# Patient Record
Sex: MALE | Race: WHITE | Employment: UNEMPLOYED | URBAN - METROPOLITAN AREA
[De-identification: names, ages, dates, MRNs, and addresses within clinical notes are randomized per-mention and may not be internally consistent; named-entity substitution may affect disease eponyms.]

---

## 2022-02-19 ENCOUNTER — HOSPITAL ENCOUNTER (OUTPATIENT)
Facility: HOSPITAL | Age: 22
Discharge: HOME | End: 2022-02-21
Attending: FAMILY MEDICINE | Admitting: FAMILY MEDICINE
Payer: COMMERCIAL

## 2022-02-19 DIAGNOSIS — K35.80 ACUTE APPENDICITIS, UNSPECIFIED ACUTE APPENDICITIS TYPE: ICD-10-CM

## 2022-02-19 DIAGNOSIS — R07.9 CHEST PAIN: ICD-10-CM

## 2022-02-19 DIAGNOSIS — K56.600 PARTIAL SMALL BOWEL OBSTRUCTION: ICD-10-CM

## 2022-02-19 DIAGNOSIS — R55 NEAR SYNCOPE: ICD-10-CM

## 2022-02-19 DIAGNOSIS — K92.2 GASTROINTESTINAL HEMORRHAGE, UNSPECIFIED GASTROINTESTINAL HEMORRHAGE TYPE: ICD-10-CM

## 2022-02-19 DIAGNOSIS — R11.2 NON-INTRACTABLE VOMITING WITH NAUSEA, UNSPECIFIED VOMITING TYPE: Primary | ICD-10-CM

## 2022-02-19 LAB
ALBUMIN SERPL BCP-MCNC: 3.6 G/DL (ref 3.5–5.2)
ALP SERPL-CCNC: 47 U/L (ref 38–126)
ALT SERPL W/O P-5'-P-CCNC: 17 U/L (ref 10–44)
ANION GAP SERPL CALC-SCNC: 13 MMOL/L (ref 8–16)
AST SERPL-CCNC: 28 U/L (ref 15–46)
BASOPHILS # BLD AUTO: 0.1 K/UL (ref 0–0.2)
BASOPHILS NFR BLD: 0.5 % (ref 0–1.9)
BILIRUB SERPL-MCNC: 1.4 MG/DL (ref 0.1–1)
CALCIUM SERPL-MCNC: 7.6 MG/DL (ref 8.7–10.5)
CHLORIDE SERPL-SCNC: 106 MMOL/L (ref 95–110)
CO2 SERPL-SCNC: 20 MMOL/L (ref 23–29)
CREAT SERPL-MCNC: 0.63 MG/DL (ref 0.5–1.4)
DIFFERENTIAL METHOD: ABNORMAL
EOSINOPHIL # BLD AUTO: 0 K/UL (ref 0–0.5)
EOSINOPHIL NFR BLD: 0.1 % (ref 0–8)
ERYTHROCYTE [DISTWIDTH] IN BLOOD BY AUTOMATED COUNT: 12.5 % (ref 11.5–14.5)
EST. GFR  (AFRICAN AMERICAN): >60 ML/MIN/1.73 M^2
EST. GFR  (NON AFRICAN AMERICAN): >60 ML/MIN/1.73 M^2
ETHANOL SERPL-MCNC: <10 MG/DL
GLUCOSE SERPL-MCNC: 113 MG/DL (ref 70–110)
HCT VFR BLD AUTO: 36.8 % (ref 40–54)
HGB BLD-MCNC: 12.5 G/DL (ref 14–18)
IMM GRANULOCYTES # BLD AUTO: 0.1 K/UL (ref 0–0.04)
IMM GRANULOCYTES NFR BLD AUTO: 0.5 % (ref 0–0.5)
LACTATE SERPL-SCNC: 3 MMOL/L (ref 0.5–2.2)
LIPASE SERPL-CCNC: 23 U/L (ref 23–300)
LYMPHOCYTES # BLD AUTO: 1.8 K/UL (ref 1–4.8)
LYMPHOCYTES NFR BLD: 9.8 % (ref 18–48)
MCH RBC QN AUTO: 30.1 PG (ref 27–31)
MCHC RBC AUTO-ENTMCNC: 34 G/DL (ref 32–36)
MCV RBC AUTO: 89 FL (ref 82–98)
MONOCYTES # BLD AUTO: 1.6 K/UL (ref 0.3–1)
MONOCYTES NFR BLD: 8.5 % (ref 4–15)
NEUTROPHILS # BLD AUTO: 15.1 K/UL (ref 1.8–7.7)
NEUTROPHILS NFR BLD: 80.6 % (ref 38–73)
NRBC BLD-RTO: 0 /100 WBC
PLATELET # BLD AUTO: 258 K/UL (ref 150–450)
PMV BLD AUTO: 11.3 FL (ref 9.2–12.9)
POCT GLUCOSE: 80 MG/DL (ref 70–110)
POTASSIUM SERPL-SCNC: 4.8 MMOL/L (ref 3.5–5.1)
PROT SERPL-MCNC: 6.9 G/DL (ref 6–8.4)
RBC # BLD AUTO: 4.15 M/UL (ref 4.6–6.2)
SARS-COV-2 RDRP RESP QL NAA+PROBE: NEGATIVE
SODIUM SERPL-SCNC: 139 MMOL/L (ref 136–145)
UUN UR-MCNC: 19 MG/DL (ref 2–20)
WBC # BLD AUTO: 18.72 K/UL (ref 3.9–12.7)

## 2022-02-19 PROCEDURE — 80053 COMPREHEN METABOLIC PANEL: CPT | Mod: ER | Performed by: PHYSICIAN ASSISTANT

## 2022-02-19 PROCEDURE — 85025 COMPLETE CBC W/AUTO DIFF WBC: CPT | Mod: ER | Performed by: PHYSICIAN ASSISTANT

## 2022-02-19 PROCEDURE — 96361 HYDRATE IV INFUSION ADD-ON: CPT

## 2022-02-19 PROCEDURE — 82962 GLUCOSE BLOOD TEST: CPT

## 2022-02-19 PROCEDURE — 25500020 PHARM REV CODE 255: Mod: ER | Performed by: FAMILY MEDICINE

## 2022-02-19 PROCEDURE — 83690 ASSAY OF LIPASE: CPT | Mod: ER | Performed by: PHYSICIAN ASSISTANT

## 2022-02-19 PROCEDURE — 63600175 PHARM REV CODE 636 W HCPCS: Mod: ER | Performed by: FAMILY MEDICINE

## 2022-02-19 PROCEDURE — 96375 TX/PRO/DX INJ NEW DRUG ADDON: CPT

## 2022-02-19 PROCEDURE — 93010 EKG 12-LEAD: ICD-10-PCS | Mod: ,,, | Performed by: INTERNAL MEDICINE

## 2022-02-19 PROCEDURE — 25000003 PHARM REV CODE 250: Mod: ER | Performed by: PHYSICIAN ASSISTANT

## 2022-02-19 PROCEDURE — 83605 ASSAY OF LACTIC ACID: CPT | Mod: ER | Performed by: PHYSICIAN ASSISTANT

## 2022-02-19 PROCEDURE — 96365 THER/PROPH/DIAG IV INF INIT: CPT | Mod: 59

## 2022-02-19 PROCEDURE — 93010 ELECTROCARDIOGRAM REPORT: CPT | Mod: ,,, | Performed by: INTERNAL MEDICINE

## 2022-02-19 PROCEDURE — 82077 ASSAY SPEC XCP UR&BREATH IA: CPT | Mod: ER | Performed by: PHYSICIAN ASSISTANT

## 2022-02-19 PROCEDURE — 63600175 PHARM REV CODE 636 W HCPCS: Mod: ER | Performed by: PHYSICIAN ASSISTANT

## 2022-02-19 PROCEDURE — 87040 BLOOD CULTURE FOR BACTERIA: CPT | Mod: ER | Performed by: PHYSICIAN ASSISTANT

## 2022-02-19 PROCEDURE — U0002 COVID-19 LAB TEST NON-CDC: HCPCS | Mod: ER | Performed by: FAMILY MEDICINE

## 2022-02-19 PROCEDURE — 93005 ELECTROCARDIOGRAM TRACING: CPT | Mod: ER

## 2022-02-19 PROCEDURE — 25000003 PHARM REV CODE 250: Mod: ER | Performed by: FAMILY MEDICINE

## 2022-02-19 RX ORDER — ONDANSETRON 2 MG/ML
4 INJECTION INTRAMUSCULAR; INTRAVENOUS
Status: DISCONTINUED | OUTPATIENT
Start: 2022-02-19 | End: 2022-02-19

## 2022-02-19 RX ORDER — PROCHLORPERAZINE EDISYLATE 5 MG/ML
10 INJECTION INTRAMUSCULAR; INTRAVENOUS
Status: COMPLETED | OUTPATIENT
Start: 2022-02-19 | End: 2022-02-19

## 2022-02-19 RX ORDER — DIPHENHYDRAMINE HYDROCHLORIDE 50 MG/ML
25 INJECTION INTRAMUSCULAR; INTRAVENOUS
Status: COMPLETED | OUTPATIENT
Start: 2022-02-19 | End: 2022-02-19

## 2022-02-19 RX ORDER — LEVOFLOXACIN 5 MG/ML
750 INJECTION, SOLUTION INTRAVENOUS
Status: COMPLETED | OUTPATIENT
Start: 2022-02-19 | End: 2022-02-20

## 2022-02-19 RX ORDER — ONDANSETRON 2 MG/ML
4 INJECTION INTRAMUSCULAR; INTRAVENOUS
Status: COMPLETED | OUTPATIENT
Start: 2022-02-19 | End: 2022-02-19

## 2022-02-19 RX ORDER — DICYCLOMINE HYDROCHLORIDE 10 MG/ML
10 INJECTION INTRAMUSCULAR
Status: DISCONTINUED | OUTPATIENT
Start: 2022-02-19 | End: 2022-02-20

## 2022-02-19 RX ORDER — FAMOTIDINE 10 MG/ML
20 INJECTION INTRAVENOUS
Status: COMPLETED | OUTPATIENT
Start: 2022-02-19 | End: 2022-02-19

## 2022-02-19 RX ADMIN — SODIUM CHLORIDE 1000 ML: 0.9 INJECTION, SOLUTION INTRAVENOUS at 11:02

## 2022-02-19 RX ADMIN — LEVOFLOXACIN 750 MG: 5 INJECTION, SOLUTION INTRAVENOUS at 10:02

## 2022-02-19 RX ADMIN — SODIUM CHLORIDE 1000 ML: 0.9 INJECTION, SOLUTION INTRAVENOUS at 09:02

## 2022-02-19 RX ADMIN — IOHEXOL 100 ML: 350 INJECTION, SOLUTION INTRAVENOUS at 10:02

## 2022-02-19 RX ADMIN — ONDANSETRON 4 MG: 2 INJECTION, SOLUTION INTRAMUSCULAR; INTRAVENOUS at 11:02

## 2022-02-19 RX ADMIN — DIPHENHYDRAMINE HYDROCHLORIDE 25 MG: 50 INJECTION INTRAMUSCULAR; INTRAVENOUS at 09:02

## 2022-02-19 RX ADMIN — PROCHLORPERAZINE EDISYLATE 10 MG: 5 INJECTION INTRAMUSCULAR; INTRAVENOUS at 09:02

## 2022-02-19 RX ADMIN — FAMOTIDINE 20 MG: 10 INJECTION, SOLUTION INTRAVENOUS at 11:02

## 2022-02-19 NOTE — Clinical Note
Alli Riley   MRN: [id number]    Department:  [facility] Emergency Department   Date of Visit:  5/29/2018           Disclosure     Insurance plans vary and the physician(s) referred by the ER may not be covered by your plan.  Please con Diagnosis: Partial small bowel obstruction [[id number]]   Future Attending Provider: EDGARD GOMEZ [[id number]]   Is the patient being sent to ED Observation?: No   Admitting Provider:: EDGARD GOMEZ [[id number]]   tell this physician (or your personal doctor if your instructions are to return to your personal doctor) about any new or lasting problems. The primary care or specialist physician will see patients referred from the [facility] Emergency Department.  Uziel Polk

## 2022-02-20 PROBLEM — R10.30 PAIN RADIATING TO LOWER ABDOMEN: Status: ACTIVE | Noted: 2022-02-20

## 2022-02-20 PROBLEM — R11.10 NON-INTRACTABLE VOMITING: Status: ACTIVE | Noted: 2022-02-20

## 2022-02-20 PROBLEM — R11.2 NON-INTRACTABLE VOMITING WITH NAUSEA: Status: ACTIVE | Noted: 2022-02-20

## 2022-02-20 PROBLEM — E86.0 DEHYDRATION: Status: ACTIVE | Noted: 2022-02-20

## 2022-02-20 LAB
ALBUMIN SERPL BCP-MCNC: 3.2 G/DL (ref 3.5–5.2)
ALP SERPL-CCNC: 57 U/L (ref 55–135)
ALT SERPL W/O P-5'-P-CCNC: 12 U/L (ref 10–44)
AMPHET+METHAMPHET UR QL: NEGATIVE
ANION GAP SERPL CALC-SCNC: 10 MMOL/L (ref 8–16)
AST SERPL-CCNC: 11 U/L (ref 10–40)
BARBITURATES UR QL SCN>200 NG/ML: NEGATIVE
BENZODIAZ UR QL SCN>200 NG/ML: NEGATIVE
BILIRUB SERPL-MCNC: 1.2 MG/DL (ref 0.1–1)
BILIRUB UR QL STRIP: NEGATIVE
BUN SERPL-MCNC: 19 MG/DL (ref 6–20)
BZE UR QL SCN: NEGATIVE
CALCIUM SERPL-MCNC: 8.6 MG/DL (ref 8.7–10.5)
CANNABINOIDS UR QL SCN: NEGATIVE
CHLORIDE SERPL-SCNC: 104 MMOL/L (ref 95–110)
CLARITY UR: CLEAR
CO2 SERPL-SCNC: 23 MMOL/L (ref 23–29)
COLOR UR: ABNORMAL
CREAT SERPL-MCNC: 0.8 MG/DL (ref 0.5–1.4)
CREAT UR-MCNC: 181.9 MG/DL (ref 23–375)
EST. GFR  (AFRICAN AMERICAN): >60 ML/MIN/1.73 M^2
EST. GFR  (NON AFRICAN AMERICAN): >60 ML/MIN/1.73 M^2
GLUCOSE SERPL-MCNC: 149 MG/DL (ref 70–110)
GLUCOSE UR QL STRIP: NEGATIVE
HGB UR QL STRIP: NEGATIVE
KETONES UR QL STRIP: ABNORMAL
LACTATE SERPL-SCNC: 1.3 MMOL/L (ref 0.5–2.2)
LEUKOCYTE ESTERASE UR QL STRIP: NEGATIVE
METHADONE UR QL SCN>300 NG/ML: NEGATIVE
NITRITE UR QL STRIP: NEGATIVE
OPIATES UR QL SCN: NEGATIVE
PCP UR QL SCN>25 NG/ML: NEGATIVE
PH UR STRIP: 7 [PH] (ref 5–8)
POCT GLUCOSE: 107 MG/DL (ref 70–110)
POCT GLUCOSE: 113 MG/DL (ref 70–110)
POTASSIUM SERPL-SCNC: 4.4 MMOL/L (ref 3.5–5.1)
PROT SERPL-MCNC: 6.5 G/DL (ref 6–8.4)
PROT UR QL STRIP: ABNORMAL
SODIUM SERPL-SCNC: 137 MMOL/L (ref 136–145)
SP GR UR STRIP: >1.03 (ref 1–1.03)
TOXICOLOGY INFORMATION: NORMAL
URN SPEC COLLECT METH UR: ABNORMAL
UROBILINOGEN UR STRIP-ACNC: NEGATIVE EU/DL

## 2022-02-20 PROCEDURE — 96367 TX/PROPH/DG ADDL SEQ IV INF: CPT

## 2022-02-20 PROCEDURE — 63600175 PHARM REV CODE 636 W HCPCS: Performed by: FAMILY MEDICINE

## 2022-02-20 PROCEDURE — G0378 HOSPITAL OBSERVATION PER HR: HCPCS

## 2022-02-20 PROCEDURE — 81003 URINALYSIS AUTO W/O SCOPE: CPT | Performed by: PHYSICIAN ASSISTANT

## 2022-02-20 PROCEDURE — 96361 HYDRATE IV INFUSION ADD-ON: CPT

## 2022-02-20 PROCEDURE — 96365 THER/PROPH/DIAG IV INF INIT: CPT

## 2022-02-20 PROCEDURE — 99203 PR OFFICE/OUTPT VISIT, NEW, LEVL III, 30-44 MIN: ICD-10-PCS | Mod: ,,, | Performed by: SURGERY

## 2022-02-20 PROCEDURE — 63600175 PHARM REV CODE 636 W HCPCS: Performed by: STUDENT IN AN ORGANIZED HEALTH CARE EDUCATION/TRAINING PROGRAM

## 2022-02-20 PROCEDURE — 80307 DRUG TEST PRSMV CHEM ANLYZR: CPT | Performed by: PHYSICIAN ASSISTANT

## 2022-02-20 PROCEDURE — 96375 TX/PRO/DX INJ NEW DRUG ADDON: CPT | Performed by: FAMILY MEDICINE

## 2022-02-20 PROCEDURE — C9113 INJ PANTOPRAZOLE SODIUM, VIA: HCPCS | Performed by: STUDENT IN AN ORGANIZED HEALTH CARE EDUCATION/TRAINING PROGRAM

## 2022-02-20 PROCEDURE — 96372 THER/PROPH/DIAG INJ SC/IM: CPT | Performed by: STUDENT IN AN ORGANIZED HEALTH CARE EDUCATION/TRAINING PROGRAM

## 2022-02-20 PROCEDURE — 96361 HYDRATE IV INFUSION ADD-ON: CPT | Performed by: FAMILY MEDICINE

## 2022-02-20 PROCEDURE — 82962 GLUCOSE BLOOD TEST: CPT

## 2022-02-20 PROCEDURE — 25000003 PHARM REV CODE 250: Performed by: STUDENT IN AN ORGANIZED HEALTH CARE EDUCATION/TRAINING PROGRAM

## 2022-02-20 PROCEDURE — 99285 EMERGENCY DEPT VISIT HI MDM: CPT | Mod: 25

## 2022-02-20 PROCEDURE — 83605 ASSAY OF LACTIC ACID: CPT | Mod: ER | Performed by: FAMILY MEDICINE

## 2022-02-20 PROCEDURE — 25000003 PHARM REV CODE 250: Mod: ER | Performed by: FAMILY MEDICINE

## 2022-02-20 PROCEDURE — S0030 INJECTION, METRONIDAZOLE: HCPCS | Mod: ER | Performed by: FAMILY MEDICINE

## 2022-02-20 PROCEDURE — 99203 OFFICE O/P NEW LOW 30 MIN: CPT | Mod: ,,, | Performed by: SURGERY

## 2022-02-20 PROCEDURE — 63600175 PHARM REV CODE 636 W HCPCS: Mod: ER | Performed by: FAMILY MEDICINE

## 2022-02-20 PROCEDURE — 80053 COMPREHEN METABOLIC PANEL: CPT | Performed by: STUDENT IN AN ORGANIZED HEALTH CARE EDUCATION/TRAINING PROGRAM

## 2022-02-20 RX ORDER — PANTOPRAZOLE SODIUM 40 MG/10ML
40 INJECTION, POWDER, LYOPHILIZED, FOR SOLUTION INTRAVENOUS DAILY
Status: DISCONTINUED | OUTPATIENT
Start: 2022-02-20 | End: 2022-02-21 | Stop reason: HOSPADM

## 2022-02-20 RX ORDER — MORPHINE SULFATE 4 MG/ML
4 INJECTION, SOLUTION INTRAMUSCULAR; INTRAVENOUS EVERY 4 HOURS PRN
Status: DISCONTINUED | OUTPATIENT
Start: 2022-02-20 | End: 2022-02-21 | Stop reason: HOSPADM

## 2022-02-20 RX ORDER — SODIUM CHLORIDE 0.9 % (FLUSH) 0.9 %
5 SYRINGE (ML) INJECTION
Status: DISCONTINUED | OUTPATIENT
Start: 2022-02-20 | End: 2022-02-21 | Stop reason: HOSPADM

## 2022-02-20 RX ORDER — SODIUM CHLORIDE 0.9 % (FLUSH) 0.9 %
10 SYRINGE (ML) INJECTION
Status: DISCONTINUED | OUTPATIENT
Start: 2022-02-20 | End: 2022-02-21 | Stop reason: HOSPADM

## 2022-02-20 RX ORDER — ACETAMINOPHEN 325 MG/1
650 TABLET ORAL EVERY 8 HOURS PRN
Status: DISCONTINUED | OUTPATIENT
Start: 2022-02-20 | End: 2022-02-21 | Stop reason: HOSPADM

## 2022-02-20 RX ORDER — ONDANSETRON 8 MG/1
8 TABLET, ORALLY DISINTEGRATING ORAL EVERY 8 HOURS PRN
Status: DISCONTINUED | OUTPATIENT
Start: 2022-02-20 | End: 2022-02-21 | Stop reason: HOSPADM

## 2022-02-20 RX ORDER — SIMETHICONE 80 MG
1 TABLET,CHEWABLE ORAL 4 TIMES DAILY PRN
Status: DISCONTINUED | OUTPATIENT
Start: 2022-02-20 | End: 2022-02-21 | Stop reason: HOSPADM

## 2022-02-20 RX ORDER — ACETAMINOPHEN 325 MG/1
650 TABLET ORAL EVERY 4 HOURS PRN
Status: DISCONTINUED | OUTPATIENT
Start: 2022-02-20 | End: 2022-02-21 | Stop reason: HOSPADM

## 2022-02-20 RX ORDER — METRONIDAZOLE 500 MG/100ML
500 INJECTION, SOLUTION INTRAVENOUS
Status: COMPLETED | OUTPATIENT
Start: 2022-02-20 | End: 2022-02-20

## 2022-02-20 RX ORDER — HEPARIN SODIUM 5000 [USP'U]/ML
5000 INJECTION, SOLUTION INTRAVENOUS; SUBCUTANEOUS EVERY 8 HOURS
Status: DISCONTINUED | OUTPATIENT
Start: 2022-02-20 | End: 2022-02-20

## 2022-02-20 RX ORDER — NALOXONE HCL 0.4 MG/ML
0.02 VIAL (ML) INJECTION
Status: DISCONTINUED | OUTPATIENT
Start: 2022-02-20 | End: 2022-02-21 | Stop reason: HOSPADM

## 2022-02-20 RX ORDER — SODIUM CHLORIDE, SODIUM LACTATE, POTASSIUM CHLORIDE, CALCIUM CHLORIDE 600; 310; 30; 20 MG/100ML; MG/100ML; MG/100ML; MG/100ML
INJECTION, SOLUTION INTRAVENOUS CONTINUOUS
Status: DISCONTINUED | OUTPATIENT
Start: 2022-02-20 | End: 2022-02-21 | Stop reason: HOSPADM

## 2022-02-20 RX ORDER — DEXTROSE, SODIUM CHLORIDE, SODIUM LACTATE, POTASSIUM CHLORIDE, AND CALCIUM CHLORIDE 5; .6; .31; .03; .02 G/100ML; G/100ML; G/100ML; G/100ML; G/100ML
INJECTION, SOLUTION INTRAVENOUS
Status: COMPLETED | OUTPATIENT
Start: 2022-02-20 | End: 2022-02-20

## 2022-02-20 RX ORDER — PROCHLORPERAZINE EDISYLATE 5 MG/ML
5 INJECTION INTRAMUSCULAR; INTRAVENOUS EVERY 6 HOURS PRN
Status: DISCONTINUED | OUTPATIENT
Start: 2022-02-20 | End: 2022-02-21 | Stop reason: HOSPADM

## 2022-02-20 RX ORDER — TALC
6 POWDER (GRAM) TOPICAL NIGHTLY PRN
Status: DISCONTINUED | OUTPATIENT
Start: 2022-02-20 | End: 2022-02-21 | Stop reason: HOSPADM

## 2022-02-20 RX ORDER — ONDANSETRON 2 MG/ML
4 INJECTION INTRAMUSCULAR; INTRAVENOUS EVERY 8 HOURS PRN
Status: DISCONTINUED | OUTPATIENT
Start: 2022-02-20 | End: 2022-02-21 | Stop reason: HOSPADM

## 2022-02-20 RX ADMIN — METRONIDAZOLE 500 MG: 500 SOLUTION INTRAVENOUS at 01:02

## 2022-02-20 RX ADMIN — ACETAMINOPHEN 650 MG: 325 TABLET ORAL at 10:02

## 2022-02-20 RX ADMIN — PANTOPRAZOLE SODIUM 40 MG: 40 INJECTION, POWDER, FOR SOLUTION INTRAVENOUS at 09:02

## 2022-02-20 RX ADMIN — ONDANSETRON 4 MG: 2 INJECTION INTRAMUSCULAR; INTRAVENOUS at 01:02

## 2022-02-20 RX ADMIN — SODIUM CHLORIDE, SODIUM LACTATE, POTASSIUM CHLORIDE, AND CALCIUM CHLORIDE: .6; .31; .03; .02 INJECTION, SOLUTION INTRAVENOUS at 08:02

## 2022-02-20 RX ADMIN — DEXTROSE, SODIUM CHLORIDE, SODIUM LACTATE, POTASSIUM CHLORIDE, AND CALCIUM CHLORIDE: 5; .6; .31; .03; .02 INJECTION, SOLUTION INTRAVENOUS at 02:02

## 2022-02-20 RX ADMIN — PROMETHAZINE HYDROCHLORIDE 12.5 MG: 25 INJECTION INTRAMUSCULAR; INTRAVENOUS at 01:02

## 2022-02-20 RX ADMIN — SODIUM CHLORIDE, SODIUM LACTATE, POTASSIUM CHLORIDE, AND CALCIUM CHLORIDE: .6; .31; .03; .02 INJECTION, SOLUTION INTRAVENOUS at 05:02
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
## 2022-02-20 NOTE — SUBJECTIVE & OBJECTIVE
History reviewed. No pertinent past medical history.    No past surgical history on file.    Review of patient's allergies indicates:  No Known Allergies    No current facility-administered medications on file prior to encounter.     No current outpatient medications on file prior to encounter.   Unknown ADHD medication (Stimulant type)  Family History    None       Tobacco Use    Smoking status: Not on file    Smokeless tobacco: Not on file   Substance and Sexual Activity    Alcohol use: Not on file    Drug use: Not on file    Sexual activity: Not on file     Review of Systems   Constitutional:  Positive for appetite change and fatigue. Negative for chills and fever.   HENT:  Negative for congestion and sore throat.    Eyes:  Negative for visual disturbance.   Respiratory:  Negative for cough, choking, chest tightness and shortness of breath.    Cardiovascular:  Negative for chest pain and palpitations.   Gastrointestinal:  Positive for abdominal pain, blood in stool, diarrhea, nausea and vomiting.   Endocrine: Negative for polyuria.   Genitourinary:  Positive for decreased urine volume. Negative for flank pain.   Musculoskeletal:  Negative for back pain, neck pain and neck stiffness.   Skin:  Negative for color change and pallor.   Neurological:  Negative for dizziness, seizures, facial asymmetry, weakness, numbness and headaches.        Presyncope without LOC one incident reported   Psychiatric/Behavioral:  Negative for agitation. The patient is not nervous/anxious.    Objective:     Vital Signs (Most Recent):  Temp: 98.5 °F (36.9 °C) (02/20/22 0703)  Pulse: 107 (02/20/22 0703)  Resp: 18 (02/20/22 0703)  BP: (!) 145/82 (02/20/22 0703)  SpO2: 100 % (02/20/22 0703)   Vital Signs (24h Range):  Temp:  [97.7 °F (36.5 °C)-98.5 °F (36.9 °C)] 98.5 °F (36.9 °C)  Pulse:  [] 107  Resp:  [14-20] 18  SpO2:  [95 %-100 %] 100 %  BP: ()/(47-89) 145/82     Weight: 104.3 kg (230 lb)  Body mass index is 31.19  kg/m².    Physical Exam  Constitutional:       Appearance: Normal appearance.   HENT:      Head: Normocephalic.      Right Ear: External ear normal.      Left Ear: External ear normal.      Nose: Nose normal.      Mouth/Throat:      Comments: Mouth was tacky with signs of dry lips  Eyes:      Extraocular Movements: Extraocular movements intact.      Conjunctiva/sclera: Conjunctivae normal.      Pupils: Pupils are equal, round, and reactive to light.   Cardiovascular:      Rate and Rhythm: Normal rate and regular rhythm.      Pulses: Normal pulses.      Heart sounds: Normal heart sounds.   Pulmonary:      Effort: Pulmonary effort is normal.      Breath sounds: Normal breath sounds. No wheezing or rhonchi.   Abdominal:      General: Bowel sounds are normal. There is no distension.      Palpations: Abdomen is soft. There is no mass.      Tenderness: There is abdominal tenderness. There is no right CVA tenderness, left CVA tenderness or rebound.      Hernia: No hernia is present.      Comments: Tenderness located in the periumbilical and right lower quadrant.    Musculoskeletal:      Cervical back: Normal range of motion.         CRANIAL NERVES     CN III, IV, VI   Pupils are equal, round, and reactive to light.     Significant Labs: All pertinent labs within the past 24 hours have been reviewed.  Bilirubin:   Recent Labs   Lab 02/19/22 2118 02/20/22  0753   BILITOT 1.4* 1.2*     Blood Culture:   Recent Labs   Lab 02/19/22  2149   LABBLOO No Growth to date     CBC:   Recent Labs   Lab 02/19/22 2118 02/21/22  1151   WBC 18.72* 4.63   HGB 12.5* 10.2*   HCT 36.8* 31.1*    247     CMP:   Recent Labs   Lab 02/19/22 2118 02/20/22  0753    137   K 4.8 4.4    104   CO2 20* 23   * 149*   BUN 19 19   CREATININE 0.63 0.8   CALCIUM 7.6* 8.6*   PROT 6.9 6.5   ALBUMIN 3.6 3.2*   BILITOT 1.4* 1.2*   ALKPHOS 47 57   AST 28 11   ALT 17 12   ANIONGAP 13 10   EGFRNONAA >60.0 >60     Lactic Acid:   Recent Labs    Lab 02/19/22  2149 02/20/22  0140   LACTATE 3.0* 1.3     POCT Glucose:   Recent Labs   Lab 02/19/22 2028 02/20/22  1132 02/20/22 2117   POCTGLUCOSE 80 113* 107     Urine Studies:   Recent Labs   Lab 02/20/22  0708   COLORU [place]*   APPEARANCEUA Clear   PHUR 7.0   SPECGRAV >1.030*   PROTEINUA Trace*   GLUCUA Negative   KETONESU 2+*   BILIRUBINUA Negative   OCCULTUA Negative   NITRITE Negative   UROBILINOGEN Negative   LEUKOCYTESUR Negative       Significant Imaging: I have reviewed all pertinent imaging results/findings within the past 24 hours.  CT: I have reviewed all pertinent results/findings within the past 24 hours and my personal findings are:  Some fat stranding is noted in the right lower quadrant. Some fat stranding is noted adjacent to the appendix which appears slightly prominent in size at some portions.  Correlate clinically for appendicitis.. In the region of the ileum, there is a linear hyperdensity measuring up to 25 mm in length may relate to a foreign body see for example coronal image 50.  There is mild dilatation of the small bowel loops measuring up to 3.2 cm consistent with partial small bowel obstruction.

## 2022-02-20 NOTE — ED NOTES
Portable Xray at bedside. Mother provided belongings bag, she stated she has all his belongings in her possession.

## 2022-02-20 NOTE — ED NOTES
Pt actively vomiting bright yellow liquid, pt is diaphoretic, ashen and pale, pt vomited apx 650 ml in emesis bag

## 2022-02-20 NOTE — ED NOTES
Assumed care of pt. Pt resting, no c/o pain, pt attempting to void for UA sample. Parents and Admit MD at bedside, POC discussed, orders will be placed.

## 2022-02-20 NOTE — CONSULTS
[facility]  INPATIENT Consult    HPI: Shawn Pizarro is a 21 y.o. male with nausea, vomiting, and diarrhea for the last five days.  The episodes have been intermittent.  He felt dehydrated yesterday and presented to the ER.  He had no real pain until yesterday when he noted some vague bilateral lower quadrant discomfort.  He denies sick contacts and does not recall eating any bad food.  CT last night was read as can't rule out PSBO or appendicitis.  He is healthy and has had no surgeries.  He feels much better now after getting IVFs.  His N/V/D has totally abated and he is quite hungry.  He denies pain or F/C.    I have reviewed the patient's chart including prior progress notes, procedures and testing.     ROS:   Review of Systems   All other systems reviewed and are negative.      PROBLEM LIST:  Patient Active Problem List   Diagnosis    Pain radiating to lower abdomen    Non-intractable vomiting with nausea    Dehydration         HISTORY  History reviewed. No pertinent past medical history.    No past surgical history on file.         No family history on file.      MEDS:  No current facility-administered medications on file prior to encounter.     No current outpatient medications on file prior to encounter.       ALLERGIES:  Review of patient's allergies indicates:  No Known Allergies      VITALS:  Temp:  [97.7 °F (36.5 °C)-98.5 °F (36.9 °C)] 98.5 °F (36.9 °C)  Pulse:  [] 107  Resp:  [14-20] 18  SpO2:  [95 %-100 %] 100 %  BP: ()/(47-89) 145/82    I/O last 3 completed shifts:  In: 2200 [IV Piggyback:2200]  Out: -       PHYSICAL EXAM:  Physical Exam  Constitutional:       Appearance: Normal appearance.   HENT:      Head: Normocephalic and atraumatic.   Eyes:      Pupils: Pupils are equal, round, and reactive to light.   Pulmonary:      Effort: Pulmonary effort is normal.   Abdominal:      General: There is no distension.      Palpations: Abdomen is soft.      Tenderness: There is no  abdominal tenderness. There is no guarding or rebound.   Skin:     General: Skin is warm and dry.   Neurological:      Mental Status: Mental status is at baseline.   Psychiatric:         Mood and Affect: Mood normal.         Behavior: Behavior normal.           LABS:  Lab Results   Component Value Date    WBC 18.72 (H) 02/19/2022    RBC 4.15 (L) 02/19/2022    HGB 12.5 (L) 02/19/2022    HCT 36.8 (L) 02/19/2022     02/19/2022     Lab Results   Component Value Date     (H) 02/20/2022     02/20/2022    K 4.4 02/20/2022     02/20/2022    CO2 23 02/20/2022    BUN 19 02/20/2022    CREATININE 0.8 02/20/2022    CALCIUM 8.6 (L) 02/20/2022     Lab Results   Component Value Date    ALT 12 02/20/2022    AST 11 02/20/2022    ALKPHOS 57 02/20/2022    BILITOT 1.2 (H) 02/20/2022     No results found for: MG, PHOS    STUDIES:  CT abd images and reports were personally reviewed.        ASSESSMENT & PLAN:  21 y.o. male with what sound like gastroenteritis or food poisoning.  He has completely benign abdominal exam.  No surgical issues at present.  Call if needed.      Rex Carmen M.D., F.A.C.S.  [facility]  [facility]

## 2022-02-20 NOTE — ED NOTES
Report given to RC Aguirre.Nurses at bedside to start IV and obtain labs, pt becoming diaphoretic and pale. Pt vomiting. HOB elevated. IVF infusing and meds given. Mother at bedside. Safety maintained.

## 2022-02-20 NOTE — NURSING
Received report from RC Rodarte in ED. Pt is being transferred to room 471.  1242- pt arrived to unit via stretcher accompanied by transport staff and parents at the bedside. Assisted to bed, oriented to room. Vs obtained and documented. Placed yellow socks on. Bed at lowest position, wheels locked, call light within reach. IV fluids LR infusing at 125ml/hr. Doctor came to the bedside. Full liquid diet order placed per MD. Pt to remain in observation.

## 2022-02-20 NOTE — ED NOTES
Pt pale, ashen, diaphetic at this time, pt c/o left lower abd and states he feel like he needs to vomit.

## 2022-02-20 NOTE — PLAN OF CARE
Problem: Adult Inpatient Plan of Care  Goal: Plan of Care Review  Outcome: Ongoing, Progressing      02/20/22 1333   Admission   Initial VN Admission Questions Complete  (Patient arrived to unit, AAOx4. Admission questions complete. Patient reports abdominal pain 2/10; prn medicationr regimen reviewed with patient. POC reviewed. Allowed time for questions. NAD noted.)   Communication Issues? None   Shift   Virtual Nurse - Rounding Complete   Virtual Nurse - Patient Verbalized Approval Of Camera Use;VN Rounding   Type of Frequent Check   Type   (Admission)   Safety/Activity   Patient Rounds visualized patient;call light in patient/parent reach   Safety Promotion/Fall Prevention instructed to call staff for mobility;assistive device/personal item within reach   Pain/Comfort/Sleep   Preferred Pain Scale number (Numeric Rating Pain Scale)   Comfort/Acceptable Pain Level 2   Pain Body Location abdomen   Pain Rating (0-10): Rest 2   Sleep/Rest/Relaxation awake

## 2022-02-20 NOTE — PROVIDER PROGRESS NOTES - EMERGENCY DEPT.
Encounter Date: 2/19/2022    ED Physician Progress Notes        Physician Note:   21-year-old male presents to ER with nausea, vomiting, diarrhea for last 3 days.  Decreased appetite.  Correlate symptoms started after eating at a [employer] restaurant.  Had 2 large vomiting in the ED.  no fever.  Pain in abdomen is across lower abdomen.  Today not able to tolerate anything orally.  Elevated white cell count 18,000. Lactic acid 3.0.  CT of abdomen= partial small-bowel obstruction and concern for appendicitis.  Patient has been given IV fluids, Levaquin, Flagyl, Compazine, Benadryl, Zofran.  Surgical consult with  does not think needs admission or surgical intervention.  Has a was concerned for possibility of bacterial infection with elevated white cell count and unable to tolerate anything orally requires inpatient admission and I am not comfortable discharging this patient as advised.  Then he suggested will take consult from Medicine Department.  Patient primary is Dr. Garry Herrera.- family medicine., discuss with [facility] accepted patient for admission.

## 2022-02-20 NOTE — ED TRIAGE NOTES
Patient transferred from [facility] for acute appendicitis and partial small bowel. Patient states he is currently not in any pain nor has nausea. Parents are at the bedside. Mom states doctors went back and forth on whether patient would need surgery.     Review of patient's allergies indicates:  No Known Allergies     Patient has verified the spelling of their name and  on armband.   APPEARANCE: Patient is alert, calm, oriented x 4, and appears fatigued  SKIN: Skin is normal for race, warm, and dry. Normal skin turgor and mucous membranes moist.  CARDIAC: Normal rate and rhythm, no murmur heard.   RESPIRATORY:Normal rate and effort. Breath sounds clear bilaterally throughout chest. Respirations are equal and unlabored.    GASTRO: Bowel sounds normal, abdomen is soft, no tenderness, and no abdominal distention.  MUSCLE: Full ROM. No bony tenderness or soft tissue tenderness. No obvious deformity.  : Voids without complication

## 2022-02-21 VITALS
DIASTOLIC BLOOD PRESSURE: 65 MMHG | OXYGEN SATURATION: 100 % | RESPIRATION RATE: 18 BRPM | WEIGHT: 229.94 LBS | HEIGHT: 72 IN | HEART RATE: 83 BPM | TEMPERATURE: 97 F | BODY MASS INDEX: 31.14 KG/M2 | SYSTOLIC BLOOD PRESSURE: 126 MMHG

## 2022-02-21 LAB
ALBUMIN SERPL BCP-MCNC: 3 G/DL (ref 3.5–5.2)
ALP SERPL-CCNC: 48 U/L (ref 55–135)
ALT SERPL W/O P-5'-P-CCNC: 14 U/L (ref 10–44)
ANION GAP SERPL CALC-SCNC: 10 MMOL/L (ref 8–16)
AST SERPL-CCNC: 13 U/L (ref 10–40)
BASOPHILS # BLD AUTO: 0.03 K/UL (ref 0–0.2)
BASOPHILS NFR BLD: 0.6 % (ref 0–1.9)
BILIRUB SERPL-MCNC: 0.6 MG/DL (ref 0.1–1)
BUN SERPL-MCNC: 9 MG/DL (ref 6–20)
CALCIUM SERPL-MCNC: 8.8 MG/DL (ref 8.7–10.5)
CHLORIDE SERPL-SCNC: 103 MMOL/L (ref 95–110)
CO2 SERPL-SCNC: 27 MMOL/L (ref 23–29)
CREAT SERPL-MCNC: 0.7 MG/DL (ref 0.5–1.4)
DIFFERENTIAL METHOD: ABNORMAL
EOSINOPHIL # BLD AUTO: 0.3 K/UL (ref 0–0.5)
EOSINOPHIL NFR BLD: 6.9 % (ref 0–8)
ERYTHROCYTE [DISTWIDTH] IN BLOOD BY AUTOMATED COUNT: 12.5 % (ref 11.5–14.5)
EST. GFR  (AFRICAN AMERICAN): >60 ML/MIN/1.73 M^2
EST. GFR  (NON AFRICAN AMERICAN): >60 ML/MIN/1.73 M^2
GLUCOSE SERPL-MCNC: 82 MG/DL (ref 70–110)
HCT VFR BLD AUTO: 31.1 % (ref 40–54)
HGB BLD-MCNC: 10.2 G/DL (ref 14–18)
IMM GRANULOCYTES # BLD AUTO: 0.01 K/UL (ref 0–0.04)
IMM GRANULOCYTES NFR BLD AUTO: 0.2 % (ref 0–0.5)
LYMPHOCYTES # BLD AUTO: 1.6 K/UL (ref 1–4.8)
LYMPHOCYTES NFR BLD: 34.8 % (ref 18–48)
MAGNESIUM SERPL-MCNC: 1.9 MG/DL (ref 1.6–2.6)
MCH RBC QN AUTO: 29.8 PG (ref 27–31)
MCHC RBC AUTO-ENTMCNC: 32.8 G/DL (ref 32–36)
MCV RBC AUTO: 91 FL (ref 82–98)
MONOCYTES # BLD AUTO: 0.5 K/UL (ref 0.3–1)
MONOCYTES NFR BLD: 10.6 % (ref 4–15)
NEUTROPHILS # BLD AUTO: 2.2 K/UL (ref 1.8–7.7)
NEUTROPHILS NFR BLD: 46.9 % (ref 38–73)
NRBC BLD-RTO: 0 /100 WBC
OB PNL STL: POSITIVE
PHOSPHATE SERPL-MCNC: 3 MG/DL (ref 2.7–4.5)
PLATELET # BLD AUTO: 247 K/UL (ref 150–450)
PMV BLD AUTO: 10.9 FL (ref 9.2–12.9)
POTASSIUM SERPL-SCNC: 4 MMOL/L (ref 3.5–5.1)
PROT SERPL-MCNC: 6.6 G/DL (ref 6–8.4)
RBC # BLD AUTO: 3.42 M/UL (ref 4.6–6.2)
SODIUM SERPL-SCNC: 140 MMOL/L (ref 136–145)
WBC # BLD AUTO: 4.63 K/UL (ref 3.9–12.7)
WBC #/AREA STL HPF: NORMAL /[HPF]

## 2022-02-21 PROCEDURE — 63600175 PHARM REV CODE 636 W HCPCS: Performed by: STUDENT IN AN ORGANIZED HEALTH CARE EDUCATION/TRAINING PROGRAM

## 2022-02-21 PROCEDURE — 87046 STOOL CULTR AEROBIC BACT EA: CPT | Mod: 59 | Performed by: STUDENT IN AN ORGANIZED HEALTH CARE EDUCATION/TRAINING PROGRAM

## 2022-02-21 PROCEDURE — 36415 COLL VENOUS BLD VENIPUNCTURE: CPT | Performed by: STUDENT IN AN ORGANIZED HEALTH CARE EDUCATION/TRAINING PROGRAM

## 2022-02-21 PROCEDURE — 89055 LEUKOCYTE ASSESSMENT FECAL: CPT | Performed by: STUDENT IN AN ORGANIZED HEALTH CARE EDUCATION/TRAINING PROGRAM

## 2022-02-21 PROCEDURE — 96361 HYDRATE IV INFUSION ADD-ON: CPT | Performed by: FAMILY MEDICINE

## 2022-02-21 PROCEDURE — 83735 ASSAY OF MAGNESIUM: CPT | Performed by: STUDENT IN AN ORGANIZED HEALTH CARE EDUCATION/TRAINING PROGRAM

## 2022-02-21 PROCEDURE — 87177 OVA AND PARASITES SMEARS: CPT | Performed by: STUDENT IN AN ORGANIZED HEALTH CARE EDUCATION/TRAINING PROGRAM

## 2022-02-21 PROCEDURE — 87427 SHIGA-LIKE TOXIN AG IA: CPT | Performed by: STUDENT IN AN ORGANIZED HEALTH CARE EDUCATION/TRAINING PROGRAM

## 2022-02-21 PROCEDURE — 87329 GIARDIA AG IA: CPT | Performed by: STUDENT IN AN ORGANIZED HEALTH CARE EDUCATION/TRAINING PROGRAM

## 2022-02-21 PROCEDURE — 87045 FECES CULTURE AEROBIC BACT: CPT | Performed by: STUDENT IN AN ORGANIZED HEALTH CARE EDUCATION/TRAINING PROGRAM

## 2022-02-21 PROCEDURE — 87425 ROTAVIRUS AG IA: CPT | Performed by: STUDENT IN AN ORGANIZED HEALTH CARE EDUCATION/TRAINING PROGRAM

## 2022-02-21 PROCEDURE — G0378 HOSPITAL OBSERVATION PER HR: HCPCS

## 2022-02-21 PROCEDURE — 82272 OCCULT BLD FECES 1-3 TESTS: CPT | Performed by: STUDENT IN AN ORGANIZED HEALTH CARE EDUCATION/TRAINING PROGRAM

## 2022-02-21 PROCEDURE — 94761 N-INVAS EAR/PLS OXIMETRY MLT: CPT

## 2022-02-21 PROCEDURE — 96376 TX/PRO/DX INJ SAME DRUG ADON: CPT | Performed by: FAMILY MEDICINE

## 2022-02-21 PROCEDURE — 84100 ASSAY OF PHOSPHORUS: CPT | Performed by: STUDENT IN AN ORGANIZED HEALTH CARE EDUCATION/TRAINING PROGRAM

## 2022-02-21 PROCEDURE — 82656 EL-1 FECAL QUAL/SEMIQ: CPT | Performed by: STUDENT IN AN ORGANIZED HEALTH CARE EDUCATION/TRAINING PROGRAM

## 2022-02-21 PROCEDURE — 80053 COMPREHEN METABOLIC PANEL: CPT | Performed by: STUDENT IN AN ORGANIZED HEALTH CARE EDUCATION/TRAINING PROGRAM

## 2022-02-21 PROCEDURE — 85025 COMPLETE CBC W/AUTO DIFF WBC: CPT | Performed by: STUDENT IN AN ORGANIZED HEALTH CARE EDUCATION/TRAINING PROGRAM

## 2022-02-21 PROCEDURE — C9113 INJ PANTOPRAZOLE SODIUM, VIA: HCPCS | Performed by: STUDENT IN AN ORGANIZED HEALTH CARE EDUCATION/TRAINING PROGRAM

## 2022-02-21 PROCEDURE — 83993 ASSAY FOR CALPROTECTIN FECAL: CPT | Performed by: STUDENT IN AN ORGANIZED HEALTH CARE EDUCATION/TRAINING PROGRAM

## 2022-02-21 PROCEDURE — 87209 SMEAR COMPLEX STAIN: CPT | Performed by: STUDENT IN AN ORGANIZED HEALTH CARE EDUCATION/TRAINING PROGRAM

## 2022-02-21 RX ADMIN — PANTOPRAZOLE SODIUM 40 MG: 40 INJECTION, POWDER, FOR SOLUTION INTRAVENOUS at 09:02

## 2022-02-21 RX ADMIN — SODIUM CHLORIDE, SODIUM LACTATE, POTASSIUM CHLORIDE, AND CALCIUM CHLORIDE: .6; .31; .03; .02 INJECTION, SOLUTION INTRAVENOUS at 03:02

## 2022-02-21 NOTE — PLAN OF CARE
Sw met with pt and pt's mother Valerie [phone] at bedside to complete final assessment. Pt will have his mother help transport him home at d/c today. Pt has f/u apts scheduled on AVS. Rounds completed on pt.  All questions addressed.  Bedside nurse to discuss d/c medications.  Discussed importance to attend all f/u appts and take medications as prescribed.  Verbalized understanding.    ODALYS Rivera  [phone]       02/21/22 2126   Final Note   Assessment Type Final Discharge Note   Anticipated Discharge Disposition Home   What phone number can be called within the next 1-3 days to see how you are doing after discharge? [phone]   Hospital Resources/Appts/Education Provided Appointments scheduled by Navigator/Coordinator   Post-Acute Status   Coverage BLUE CROSS BLUE SHEILD   Discharge Delays None known at this time

## 2022-02-21 NOTE — SIGNIFICANT EVENT
Nurse reports 1x episode of dark, bloody stool. Patient NAD, VSS. Will obtain stool studies, initiate protonix, and consult [facility]. Possible EHEC suspected.     Ashley Brady MD  [facility] PGY3

## 2022-02-21 NOTE — SUBJECTIVE & OBJECTIVE
Interval History:  Endorsed a bloody bowel movement yesterday.  Stool studies pending.    Review of Systems   Constitutional:  Negative for appetite change, chills, fatigue and fever.   HENT:  Negative for congestion and sore throat.    Eyes:  Negative for visual disturbance.   Respiratory:  Negative for cough, choking, chest tightness and shortness of breath.    Cardiovascular:  Negative for chest pain and palpitations.   Gastrointestinal:  Positive for blood in stool. Negative for abdominal pain, diarrhea, nausea and vomiting.   Endocrine: Negative for polyuria.   Genitourinary:  Negative for decreased urine volume and flank pain.   Musculoskeletal:  Negative for back pain, neck pain and neck stiffness.   Skin:  Negative for color change and pallor.   Neurological:  Negative for dizziness, seizures, facial asymmetry, weakness, numbness and headaches.   Psychiatric/Behavioral:  Negative for agitation. The patient is not nervous/anxious.    Objective:     Vital Signs (Most Recent):  Temp: 97 °F (36.1 °C) (02/21/22 0727)  Pulse: 83 (02/21/22 0727)  Resp: 18 (02/21/22 0727)  BP: 126/65 (02/21/22 0727)  SpO2: 100 % (02/21/22 1122) Vital Signs (24h Range):  Temp:  [97 °F (36.1 °C)-98.5 °F (36.9 °C)] 97 °F (36.1 °C)  Pulse:  [76-98] 83  Resp:  [16-18] 18  SpO2:  [98 %-100 %] 100 %  BP: (123-130)/(65-71) 126/65     Weight: 104.3 kg (229 lb 15 oz)  Body mass index is 31.19 kg/m².    Intake/Output Summary (Last 24 hours) at 2/21/2022 1439  Last data filed at 2/21/2022 0500  Gross per 24 hour   Intake --   Output 1000 ml   Net -1000 ml      Physical Exam  Constitutional:       Appearance: Normal appearance.   HENT:      Head: Normocephalic.      Right Ear: External ear normal.      Left Ear: External ear normal.      Nose: Nose normal.   Eyes:      Extraocular Movements: Extraocular movements intact.      Conjunctiva/sclera: Conjunctivae normal.      Pupils: Pupils are equal, round, and reactive to light.   Cardiovascular:       Rate and Rhythm: Normal rate and regular rhythm.      Pulses: Normal pulses.      Heart sounds: Normal heart sounds.   Pulmonary:      Effort: Pulmonary effort is normal.      Breath sounds: Normal breath sounds. No wheezing or rhonchi.   Abdominal:      General: Bowel sounds are normal. There is no distension.      Palpations: Abdomen is soft. There is no mass.      Tenderness: There is no abdominal tenderness. There is no right CVA tenderness, left CVA tenderness or rebound.      Hernia: No hernia is present.   Musculoskeletal:      Cervical back: Normal range of motion.       Significant Labs: All pertinent labs within the past 24 hours have been reviewed.  CBC:   Recent Labs   Lab 02/19/22 2118 02/21/22  1151   WBC 18.72* 4.63   HGB 12.5* 10.2*   HCT 36.8* 31.1*    247     CMP:   Recent Labs   Lab 02/19/22 2118 02/20/22  0753 02/21/22  1151    137 140   K 4.8 4.4 4.0    104 103   CO2 20* 23 27   * 149* 82   BUN 19 19 9   CREATININE 0.63 0.8 0.7   CALCIUM 7.6* 8.6* 8.8   PROT 6.9 6.5 6.6   ALBUMIN 3.6 3.2* 3.0*   BILITOT 1.4* 1.2* 0.6   ALKPHOS 47 57 48*   AST 28 11 13   ALT 17 12 14   ANIONGAP 13 10 10   EGFRNONAA >60.0 >60 >60     Lactic Acid:   Recent Labs   Lab 02/19/22  2149 02/20/22  0140   LACTATE 3.0* 1.3     Magnesium:   Recent Labs   Lab 02/21/22  1151   MG 1.9       POCT Glucose:   Recent Labs   Lab 02/19/22 2028 02/20/22  1132 02/20/22 2117   POCTGLUCOSE 80 113* 107     Urine Studies:   Recent Labs   Lab 02/20/22  0708   COLORU [place]*   APPEARANCEUA Clear   PHUR 7.0   SPECGRAV >1.030*   PROTEINUA Trace*   GLUCUA Negative   KETONESU 2+*   BILIRUBINUA Negative   OCCULTUA Negative   NITRITE Negative   UROBILINOGEN Negative   LEUKOCYTESUR Negative       Significant Imaging: I have reviewed all pertinent imaging results/findings within the past 24 hours.

## 2022-02-21 NOTE — PROGRESS NOTES
Pt has blood in stool. Pt has advance as tolerated order. Pt wants to eat regular diet and has tolerated liquid diet per day shift RC Esposito. MD Nikhil contacted about blood in stool and diet. No new orders given. MD Nikhil is reviewing pt's chart.

## 2022-02-21 NOTE — CONSULTS
[facility]    CC: Nausea/vomiting    HPI 21 y.o. male with no past medical history who presented with 3 day history of severe nausea, vomiting with associated diarrhea and dark stools. GI was consulted for dark black bowel movements. Patients symptoms started 2/17 after eating a hamburger the day prior. He has intermittent non bloody non bilious vomiting and non bloody diarrhea with 5-6 BM a day. He denies any abdominal pain. He was unable to keep food down and has experienced generalized fatigue due to dehydration. Yesterday, patient had 1 episode of dark red stool. He denies heavy NSAID use, no pain with passing BM. He has never had rectal bleeding in the past and denies any previous endoscopic procedures.     Chart reviewed and summarized here.    Past Medical History  History reviewed. No pertinent past medical history.      Social History  Tobacco: None  Alcohol: Socially, 1-2x week  Drugs: None  Occupation: Student     Family History  No family history of colon or liver cancer     Review of Systems  General ROS: negative for chills, fever or weight loss  Psychological ROS: negative for hallucination, depression or suicidal ideation  Ophthalmic ROS: negative for blurry vision, photophobia or eye pain  ENT ROS: negative for epistaxis, sore throat or rhinorrhea  Respiratory ROS: no cough, shortness of breath, or wheezing  Cardiovascular ROS: no chest pain or dyspnea on exertion  Gastrointestinal ROS: Positive n/v, diarrhea, dark stools. No abdominal pain.   Genito-Urinary ROS: no dysuria, trouble voiding, or hematuria  Musculoskeletal ROS: negative for gait disturbance or muscular weakness  Neurological ROS: no syncope or seizures; no ataxia  Dermatological ROS: negative for pruritis, rash and jaundice    Physical Examination  /65 (BP Location: Left arm, Patient Position: Lying)   Pulse 83   Temp 97 °F (36.1 °C) (Oral)   Resp 18   Ht 6' (1.829 m)   Wt 104.3 kg (229 lb 15 oz)   SpO2 98%    BMI 31.19 kg/m²      General appearance: alert, cooperative, no distress  HENT: Normocephalic, atraumatic, neck symmetrical, no nasal discharge   Eyes: conjunctivae/corneas clear, PERRL, EOM's intact  Lungs: clear to auscultation bilaterally, no dullness to percussion bilaterally  Heart: regular rate and rhythm without rub; no displacement of the PMI   Abdomen: soft, non-tender; bowel sounds normoactive; no organomegaly. GARRISON with no external hemorrhoids, melenic stool present   Extremities: extremities symmetric; no clubbing, cyanosis, or edema  Integument: Skin color, texture, turgor normal; no rashes; hair distrubution normal  Neurologic: Alert and oriented X 3, normal strength, normal coordination and gait  Psychiatric: no pressured speech; normal affect; no evidence of impaired cognition     Labs:  WBC: 18.72  H/H: 12.5/36.8  Plt: 258  BUN: 19  Cr: 0.8      Imaging:  CT AP: Fat stranding adjacent to appendix. Partial small bowel obstruction with mild dilation of small bowel measuring 3.2 cm. Linear hyper density measuring 25 mm in ileum, possible foreign body    Abdominal X ray: Dilation of small bowel with continued gas and stool seen in colon     I have personally reviewed and interpreted these images.    Assessment:    21 y.o. male with no past medical history who presented with 3 day history of nausea, vomiting, diarrhea and recent dark bloody stools.He does state he may have eaten some aluminum foil that was attached to his hamburger. HDS, WBC 18.72, Hb 12.5, BUN 19, Cr 0.8. CT AP with possible appendicitis and partial SBO, and possible 25 mm linear foreign body in ileum.  This presentation is most consistent with acute infectious gastroenteritis (inflammatory) although other potential diagnoses include appendicitis, inflammatory bowel disease, bowel injury from foreign body, and angioedema     Plan:   Supportive care including IVFs   Hold off on additional interventions for now as he is improving  clinically without any other therapy   Stool studies     Lui Milian MD   [address], [address]   [place], [place] [id number] [phone]

## 2022-02-21 NOTE — PLAN OF CARE
Davis met with pt's FATHER MR. Pizarro [phone] since pt was sleeping. Pts MOTHER JOSIE [phone] is on her way to hospital. Pt has no equipment and no medication issues. Sw will request f/u apts. White board updated with CM name and contact information.  Discharge brochure provided.  Pt encouraged to call with any questions or concerns.  Cm will continue to follow pt through transitions of care and assist with any discharge needs.    Akshat Colindres, MSNIKA  [phone]       02/21/22 0950   Discharge Assessment   Assessment Type Discharge Planning Assessment   Confirmed/corrected address, phone number and insurance Yes   Confirmed Demographics Correct on Facesheet   Source of Information family   Does patient/caregiver understand observation status Yes   Reason For Admission Pain radiating to lower abdomen   Lives With parent(s)   Facility Arrived From: HOME   Do you expect to return to your current living situation? Yes   Do you have help at home or someone to help you manage your care at home? Yes   Who are your caregiver(s) and their phone number(s)? MOTHER JOSIE [phone] OR FATHER MR. Pizarro [phone]   Prior to hospitilization cognitive status: Unable to Assess   Current cognitive status: Unable to Assess   Walking or Climbing Stairs Difficulty none   Dressing/Bathing Difficulty none   Home Layout Able to live on 1st floor   Equipment Currently Used at Home none   Readmission within 30 days? No   Patient currently being followed by outpatient case management? No   Do you currently have service(s) that help you manage your care at home? No   Do you take prescription medications? No   Do you have prescription coverage? Yes   Coverage BCBS   Do you have any problems affording any of your prescribed medications? No   Is the patient taking medications as prescribed? no   Who is going to help you get home at discharge? MOTHER JOSIE [phone] OR FATHER MR. Pizarro [phone]   How do you get to doctors  appointments? car, drives self;family or friend will provide   Are you on dialysis? No   Do you take coumadin? No   Discharge Plan A Home with family   DME Needed Upon Discharge  other (see comments)  (TBD)   Discharge Plan discussed with: Parent(s)   Name(s) and Number(s) MOTHER JOSIE [phone] OR FATHER MR. Pizarro [phone]   Discharge Barriers Identified None   Relationship/Environment   Name(s) of Who Lives With Patient MOTHER JOSIE [phone] OR FATHER MR. Pizarro [phone]

## 2022-02-21 NOTE — ASSESSMENT & PLAN NOTE
Patient given 2L NS in ED  Provided 1L LR   Maintain NPO incase surgery is needed  Continue IVF maintenance  Monitor I/Os  Reassess fluid status

## 2022-02-21 NOTE — HOSPITAL COURSE
CT abdomen pelvis showing fat stranding in the right lower quadrant, correlate clinically for appendicitis. General surgery was consulted given concern for appendicitis. Determined etiology likely gastroenteritis or food poisoning.  During hospitalization, reported one large bloody bowel movement. GI was consulted and stool samples were obtained.  Etiology likely E coli. GI recommended supportive care and close follow-up as an outpatient. Patient was tolerating his diet prior to discharge. Was advised on return precautions. Stool sample still pending results. Provided an ambulatory referral to GI Dr. Milian for hospital follow-up. Patient was stable at discharge.
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
## 2022-02-21 NOTE — PROGRESS NOTES
Conversation: Lab Results  (Newest Message First)  Travis [address]    5/20/21 5:32 PM  Note     Called patient regarding lab results but patient did not answer.  Message was left.            JR    5/20/21 5:30 PM  Crystal Camargo MA cont [facility] Medicine  Progress Note    Patient Name: Shawn Pizarro  MRN: [id number]  Patient Class: OP- Observation   Admission Date: 2/19/2022  Length of Stay: 0 days  Attending Physician: Wes Canseco III, MD  Primary Care Provider: Matheus Vyas MD        Subjective:     Principal Problem:Non-intractable vomiting        HPI:  Patient is a preciously healthy 21 year old male with pmhx of ADHD on medication presenting for nausea vomiting and abdomen pain. Initial preesentation was one bout of vomit Thursday 2/17/22 monring followed by diarrhea and mild nausea. Symptoms somewhat improved but nuasea persisted through Saturday. Patient was out with friends when he began vomiting repreatedly and complained of severe 10/10 abdomen pain. Brought to the ED at [facility] and assessed for appendicitis. He was found to have elevated Lactic acid and WBC. CT of abdomen could not rule out appendicitis but was not definitive. Patient to be admitted for continued work-up and surgery consult.      Overview/Hospital Course:  No notes on file    Interval History:  Endorsed a bloody bowel movement yesterday.  Stool studies pending.    Review of Systems   Constitutional:  Negative for appetite change, chills, fatigue and fever.   HENT:  Negative for congestion and sore throat.    Eyes:  Negative for visual disturbance.   Respiratory:  Negative for cough, choking, chest tightness and shortness of breath.    Cardiovascular:  Negative for chest pain and palpitations.   Gastrointestinal:  Positive for blood in stool. Negative for abdominal pain, diarrhea, nausea and vomiting.   Endocrine: Negative for polyuria.   Genitourinary:  Negative for decreased urine volume and flank pain.   Musculoskeletal:  Negative for back pain, neck pain and neck stiffness.   Skin:  Negative for color change and pallor.   Neurological:  Negative for dizziness, seizures, facial asymmetry, weakness, numbness and headaches.    Psychiatric/Behavioral:  Negative for agitation. The patient is not nervous/anxious.    Objective:     Vital Signs (Most Recent):  Temp: 97 °F (36.1 °C) (02/21/22 0727)  Pulse: 83 (02/21/22 0727)  Resp: 18 (02/21/22 0727)  BP: 126/65 (02/21/22 0727)  SpO2: 100 % (02/21/22 1122) Vital Signs (24h Range):  Temp:  [97 °F (36.1 °C)-98.5 °F (36.9 °C)] 97 °F (36.1 °C)  Pulse:  [76-98] 83  Resp:  [16-18] 18  SpO2:  [98 %-100 %] 100 %  BP: (123-130)/(65-71) 126/65     Weight: 104.3 kg (229 lb 15 oz)  Body mass index is 31.19 kg/m².    Intake/Output Summary (Last 24 hours) at 2/21/2022 1439  Last data filed at 2/21/2022 0500  Gross per 24 hour   Intake --   Output 1000 ml   Net -1000 ml      Physical Exam  Constitutional:       Appearance: Normal appearance.   HENT:      Head: Normocephalic.      Right Ear: External ear normal.      Left Ear: External ear normal.      Nose: Nose normal.   Eyes:      Extraocular Movements: Extraocular movements intact.      Conjunctiva/sclera: Conjunctivae normal.      Pupils: Pupils are equal, round, and reactive to light.   Cardiovascular:      Rate and Rhythm: Normal rate and regular rhythm.      Pulses: Normal pulses.      Heart sounds: Normal heart sounds.   Pulmonary:      Effort: Pulmonary effort is normal.      Breath sounds: Normal breath sounds. No wheezing or rhonchi.   Abdominal:      General: Bowel sounds are normal. There is no distension.      Palpations: Abdomen is soft. There is no mass.      Tenderness: There is no abdominal tenderness. There is no right CVA tenderness, left CVA tenderness or rebound.      Hernia: No hernia is present.   Musculoskeletal:      Cervical back: Normal range of motion.       Significant Labs: All pertinent labs within the past 24 hours have been reviewed.  CBC:   Recent Labs   Lab 02/19/22  2118 02/21/22  1151   WBC 18.72* 4.63   HGB 12.5* 10.2*   HCT 36.8* 31.1*    247     CMP:   Recent Labs   Lab 02/19/22  2118 02/20/22  0755  02/21/22  1151    137 140   K 4.8 4.4 4.0    104 103   CO2 20* 23 27   * 149* 82   BUN 19 19 9   CREATININE 0.63 0.8 0.7   CALCIUM 7.6* 8.6* 8.8   PROT 6.9 6.5 6.6   ALBUMIN 3.6 3.2* 3.0*   BILITOT 1.4* 1.2* 0.6   ALKPHOS 47 57 48*   AST 28 11 13   ALT 17 12 14   ANIONGAP 13 10 10   EGFRNONAA >60.0 >60 >60     Lactic Acid:   Recent Labs   Lab 02/19/22  2149 02/20/22  0140   LACTATE 3.0* 1.3     Magnesium:   Recent Labs   Lab 02/21/22  1151   MG 1.9       POCT Glucose:   Recent Labs   Lab 02/19/22 2028 02/20/22  1132 02/20/22  2117   POCTGLUCOSE 80 113* 107     Urine Studies:   Recent Labs   Lab 02/20/22  0708   COLORU [place]*   APPEARANCEUA Clear   PHUR 7.0   SPECGRAV >1.030*   PROTEINUA Trace*   GLUCUA Negative   KETONESU 2+*   BILIRUBINUA Negative   OCCULTUA Negative   NITRITE Negative   UROBILINOGEN Negative   LEUKOCYTESUR Negative       Significant Imaging: I have reviewed all pertinent imaging results/findings within the past 24 hours.      Assessment/Plan:     #GI bleed  #Gastroenteritis  - evaluated by general surgery, likely not appendicitis  - patient with positive bloody bowel movement and known consumption of ground beef  - GI consulted, recommending conservative management  - currently tolerating diet today, will discharge home with close follow-up with GI.      VTE Risk Mitigation (From admission, onward)         Ordered     IP VTE HIGH RISK PATIENT  Once         02/20/22 0717     Place sequential compression device  Until discontinued         02/20/22 0717     Place sequential compression device  Until discontinued         02/20/22 0717                Discharge Planning   TIM: 2/21/2022     Code Status: Full Code   Is the patient medically ready for discharge?:     Reason for patient still in hospital (select all that apply): Consult recommendations  Discharge Plan A: Home with family            Eliecer Newby,   Department of Hospital Medicine   [place] - [facility]

## 2022-02-21 NOTE — DISCHARGE SUMMARY
[facility] Medicine  Discharge Summary      Patient Name: Shawn Pizarro  MRN: [id number]  Patient Class: OP- Observation  Admission Date: 2/19/2022  Hospital Length of Stay: 0 days  Discharge Date and Time:  02/21/2022 2:51 PM  Attending Physician: Wes Canseco III, MD   Discharging Provider: Eliecer Newby DO  Primary Care Provider: Matheus Vyas MD      HPI:   Patient is a preciously healthy 21 year old male with pmhx of ADHD on medication presenting for nausea vomiting and abdomen pain. Initial preesentation was one bout of vomit Thursday 2/17/22 monring followed by diarrhea and mild nausea. Symptoms somewhat improved but nuasea persisted through Saturday. Patient was out with friends when he began vomiting repreatedly and complained of severe 10/10 abdomen pain. Brought to the ED at [facility] and assessed for appendicitis. He was found to have elevated Lactic acid and WBC. CT of abdomen could not rule out appendicitis but was not definitive. Patient to be admitted for continued work-up and surgery consult.      * No surgery found *      Hospital Course:   CT abdomen pelvis showing fat stranding in the right lower quadrant, correlate clinically for appendicitis. General surgery was consulted given concern for appendicitis. Determined etiology likely gastroenteritis or food poisoning. During hospitalization, reported one large bloody bowel movement. GI was consulted and stool samples were obtained. Etiology likely E coli. GI recommended supportive care and close follow-up as an outpatient. Patient's abdominal pain completely resolved and was tolerating his diet prior to discharge. Was advised on return precautions. Stool samples results pending. Provided an ambulatory referral to GI Dr. Milian for hospital follow-up. Patient was stable at discharge.       Goals of Care Treatment Preferences:  Code Status: Full Code      Consults:   Consults (From admission, onward)        Status  Ordering Provider     Inpatient consult to Gastroenterology-[facility]  Once        Provider:  (Not yet assigned)    PHYLLIS Baugh     Inpatient consult to General Surgery  Once        Provider:  Rex Carmen MD    Completed TIFFANY COVARRUBIAS new Assessment & Plan notes have been filed under this hospital service since the last note was generated.  Service: Hospital Medicine    Final Active Diagnoses:    Diagnosis Date Noted POA    PRINCIPAL PROBLEM:  Non-intractable vomiting [R11.10] 02/20/2022 Yes    Pain radiating to lower abdomen [R10.30] 02/20/2022 Yes    Dehydration [E86.0] 02/20/2022 Yes      Problems Resolved During this Admission:       Discharged Condition: stable    Disposition: Home or Self Care    Follow Up:    Patient Instructions:      Ambulatory referral/consult to Gastroenterology   Standing Status: Future   Referral Priority: Routine Referral Type: Consultation   Referral Reason: Specialty Services Required   Referred to Provider: ROSENDO THEODORE Requested Specialty: Gastroenterology   Number of Visits Requested: 1       Significant Diagnostic Studies:    EXAMINATION:  CT ABDOMEN PELVIS WITH CONTRAST     CLINICAL HISTORY:  LLQ abdominal pain;     TECHNIQUE:  Low dose axial images, sagittal and coronal reformations were obtained from the lung bases to the pubic symphysis following the IV administration of 100 mL of Omnipaque 350.     COMPARISON:  None     FINDINGS:  Heart: Normal size as far as seen. No effusion as far as seen.     Lung Bases: Clear.     Liver: Normal size and attenuation. No focal lesions.     Gallbladder: No calcified gallstones.     Bile Ducts: No dilatation.     Pancreas: No mass. No peripancreatic fat stranding.     Spleen: Normal.     Adrenals: Normal.     Kidneys/Ureters: Normal enhancement.  No mass or  hydroureteronephrosis.     Bladder: No wall thickening.     Reproductive organs: Normal.     GI Tract/Mesentery: Some fat stranding is noted in  the right lower quadrant.  Some fat stranding is noted adjacent to the appendix which appears slightly prominent in size at some portions.  Correlate clinically for appendicitis..  In the region of the ileum, there is a linear hyperdensity measuring up to 25 mm in length may relate to a foreign body see for example coronal image 50.  There is mild dilatation of the small bowel loops measuring up to 3.2 cm consistent with partial small bowel obstruction.     Peritoneal Space: No ascites or free air.     Retroperitoneum: No significant adenopathy.     Abdominal wall: Small fat containing umbilical hernia.     Vasculature: No aneurysm.     Bones: No acute fracture. No suspicious lytic or sclerotic lesions.     Impression:     Some fat stranding is noted in the right lower quadrant. Some fat stranding is noted adjacent to the appendix which appears slightly prominent in size at some portions.  Correlate clinically for appendicitis.. In the region of the ileum, there is a linear hyperdensity measuring up to 25 mm in length may relate to a foreign body see for example coronal image 50.  There is mild dilatation of the small bowel loops measuring up to 3.2 cm consistent with partial small bowel obstruction.     All CT scans at this facility use dose modulation, iterative reconstruction, and/or weight based dosing when appropriate to reduce radiation dose to as low as reasonably achievable.        Electronically signed by: Ravi Ferguson  Date:                                            02/19/2022  Time:                                           23:10      Pending Diagnostic Studies:     Procedure Component Value Units Date/Time    Calprotectin, Stool [[id number]] Collected: 02/21/22 0819    Order Status: Sent Lab Status: In process Updated: 02/21/22 0836    Specimen: Stool     Fecal fat, qualitative [[id number]] Collected: 02/21/22 0819    Order Status: Sent Lab Status: In process Updated: 02/21/22 0838    Specimen: Stool      Giardia / Cryptosporidum, EIA [[id number]] Collected: 02/21/22 0819    Order Status: Sent Lab Status: In process Updated: 02/21/22 0838    Specimen: Stool     Pancreatic elastase, fecal [[id number]] Collected: 02/21/22 0819    Order Status: Sent Lab Status: In process Updated: 02/21/22 0840    Specimen: Stool     Rotavirus antigen, stool [[id number]] Collected: 02/21/22 0819    Order Status: Sent Lab Status: In process Updated: 02/21/22 0836    Specimen: Stool     Stool Exam-Ova,Cysts,Parasites [[id number]] Collected: 02/21/22 0819    Order Status: Sent Lab Status: In process Updated: 02/21/22 0835    Specimen: Stool     WBC, Stool [[id number]] Collected: 02/21/22 0819    Order Status: Sent Lab Status: In process Updated: 02/21/22 0841    Specimen: Stool          Medications:  Reconciled Home Medications:      Medication List      You have not been prescribed any medications.         Indwelling Lines/Drains at time of discharge:   Lines/Drains/Airways     None                 Time spent on the discharge of patient: 30 minutes       Eliecer Newby DO  [facility], PGY-2  Department of [facility] Medicine  [facility]

## 2022-02-21 NOTE — ASSESSMENT & PLAN NOTE
Repeat imaging ordered of the abdomen  Surgery Consulted, Dr. Carmen aware  Pre-surgical work up ordered in case surgery is needed  Continue pain management  Provide IVF maintainance  Monitor vitals

## 2022-02-22 LAB
CRYPTOSP AG STL QL IA: NEGATIVE
E COLI SXT1 STL QL IA: NEGATIVE
E COLI SXT2 STL QL IA: NEGATIVE
G LAMBLIA AG STL QL IA: NEGATIVE
RV AG STL QL IA.RAPID: NEGATIVE

## 2022-02-23 LAB — ELASTASE 1, FECAL: >500 MCG/G

## 2022-02-24 LAB
BACTERIA STL CULT: NORMAL
O+P STL MICRO: NORMAL

## 2022-02-25 LAB — BACTERIA BLD CULT: NORMAL

## 2022-02-28 LAB — CALPROTECTIN STL-MCNT: ABNORMAL MCG/G
